# Patient Record
Sex: FEMALE | Race: WHITE
[De-identification: names, ages, dates, MRNs, and addresses within clinical notes are randomized per-mention and may not be internally consistent; named-entity substitution may affect disease eponyms.]

---

## 2017-01-19 ENCOUNTER — HOSPITAL ENCOUNTER (EMERGENCY)
Dept: HOSPITAL 9 - MADERS | Age: 72
Discharge: HOME | End: 2017-01-19
Payer: MEDICARE

## 2017-01-19 DIAGNOSIS — Z79.899: ICD-10-CM

## 2017-01-19 DIAGNOSIS — I10: ICD-10-CM

## 2017-01-19 DIAGNOSIS — N39.0: Primary | ICD-10-CM

## 2017-01-19 DIAGNOSIS — J01.90: ICD-10-CM

## 2017-01-19 LAB
BACTERIA UR QL AUTO: (no result) HPF
PROT UR STRIP.AUTO-MCNC: 100 MG/DL
RBC UR QL AUTO: (no result) HPF (ref 0–3)
SP GR UR STRIP: 1.03 (ref 1–1.04)
UNIDENT CRYS #/AREA URNS HPF: (no result) HPF
WBC UR QL AUTO: (no result) HPF (ref 0–3)

## 2017-01-19 PROCEDURE — 81003 URINALYSIS AUTO W/O SCOPE: CPT

## 2017-01-19 PROCEDURE — 81015 MICROSCOPIC EXAM OF URINE: CPT

## 2017-01-19 PROCEDURE — 87086 URINE CULTURE/COLONY COUNT: CPT

## 2017-01-19 PROCEDURE — 99283 EMERGENCY DEPT VISIT LOW MDM: CPT

## 2017-01-19 NOTE — PICIS
[facility]

                                EMERGENCY RECORD



TRIAGE (19:04 MCRS)

TRIAGE NOTES:  BURNING WITH URINATION STARTED YESTERDAY -

      TODAY HAS NAUSIA AND VOMITING - ALSO COMPLAINT OF HEADACHE X1 MONTH

      WITH VERTIGO PRESENT TREATED WITH ANTIBIOTICS. (19:04 MCRS)

PATIENT: AGE: 71, GENDER: female, : , TIME OF

      GREET:  18:54, PREFERRED LANGUAGE: English,

      ETHNICITY: Not  or , ECODE BILLING MAP: [facility], Zip Code: [address], KG WEIGHT: 66.68, MEDICAL RECORD

      NUMBER: [id number], ACCOUNT NUMBER: [id number], PERSON ID:

      [id number], PCP: HEAD. (19:04 MCRS)

  NAME: Yovany Martini, PHONE: [phone] CELL. (20:32)

COMPLAINT:  HEADACHES, VOMITING, BLADDER INFECTION. (19:04 MCRS)

ADMISSION: URGENCY: 4 Non Urgent, ADMISSION SOURCE: Home,

      TRANSPORT: CAR, BED: ED -02. (19:04 MCRS)

ASSESSMENT: Assessment: NAUSIA VOMITING WITH BURINING WITH

      URINATION - HEADACHE AND DIZZINESS. (19:29 MCRS)

PAIN: Patient complains of pain described as,

      aching, on a scale 0-10 patient rates pain as 10,

      Location HEHIND EYES TO BACK OF HEAD, Pain is

      constant, Aggravating factors:, No relieving factors. (19:29

      MCRS)

SIRS SCORING: Heart Rate  (0), Temp range 96.8-101.1 (0),

      respiratory rate 12-24 (0), Mental Status altered: no (0). (19:29

      MCRS)

PROVIDERS: TRIAGE NURSE: Antonio Berumen RN. (19:04 MCRS)

VITAL SIGNS: /87, Pulse 102, Resp 18, Temp 98.9,

      (Tympanic), Pain 0, O2 Sat 95, on Room Air, Time 2017 19:00.

      (19:00 MCRS)



KNOWN ALLERGIES

Ingraham

Penicillins

Shellfish Containing Products



CURRENT MEDICATIONS

meTOPROLOL tartrate: 

      TABLET : Strength - 25 mg : ORAL

      Patient Dose: 25 mg Oral 2 times a day. (19:11 MCRS)

ursodiol: 

      CAPSULE : Strength - 300 mg : ORAL

      Patient Dose: 1 tab(s) Oral 3 times a day. (19:12 MCRS)

cyanocobalamin (vitamin B-12): 

      SYRINGE (ML) : Strength - 1,000 mcg/mL : INJECTION

      Patient Dose: 1 mcg Intramuscular once a month. (19:13

      MCRS)

potassium chloride: 

      TABLET, EXTENDED RELEASE : Strength - 20 mEq : ORAL

      Patient Dose: 20 mEq Oral once a day. (19:15 MCRS)



VITAL SIGNS (19:00 MCRS)



&a-1R&a+25V*p+0X*o7982I*c202B*c15G*c2P*p-0X&a-25V&a+1R           Name: Yovany Martini  : 1945 F71 MedRec: [id number]

                             AcctNum: [id number]

  Prepared: hal 2017 23:51 by Interface                 Page 1 of 9

                                      pMD

                        [facility]

                                EMERGENCY RECORD



VITAL SIGNS: BP: 159/87, Pulse: 102, Resp: 18, Temp: 98.9

      (Tympanic), Pain: 0, O2 sat: 95 on Room Air, Time: 2017 19:00.



NURSING ASSESSMENT: FALL RISK (20:29 MCRS)

FALL RISK: Sensory deficits (1), Total score

      1.

HENDRICH II FALL RISK: dizziness or vertigo(1), Total

      score 1.



NURSING ASSESSMENT: HEAD-TO-TOE (19:30 MCRS)

CONSTITUTIONAL: Patient arrives ambulatory, Gait steady, History

      obtained from patient, Patient appears, uncomfortable,

      Patient cooperative, Patient alert, Oriented to person, place and

      time, Skin warm, Skin dry, Skin normal in color, Mucous membranes

      pink, Mucous membranes moist, Patient is well-groomed, Patient

      complains of HEAD ACHE - NAUSIA VOMITING -BURNING WITH URINATION.

PAIN: pressure pain, BEHIND EYES, Onset of pain X1

      DAY, on a scale 0-10 patient rates pain as 10,

      BURNING URINE ONSET YESTERDAY - DIZZINESS FOR ON AND OFF SINCE

      CHRISTMASS.

NONVERBAL PAIN: Non Verbal pain assessment findings include:

      Non-verbal expressions of pain at rest (1), Facial Grimaces

      present at rest (1).

ENT: Dizziness, ON AND OFF DIZZINESS - STATES

      SINUS ISSUES, Nasal mucosa, swollen.

GENITOURINARY FEMALE: Associated with urinary

      complaints, burning, Date and time of last void:

      1 HOUR AGO.

SAFETY: Side rails up, Cart/Stretcher in lowest position, Family

      at bedside, Call light within reach, Hospital ID band on.



NURSING PROCEDURE: DISCHARGE NOTE (21:40 MCRS)

DISCHARGE: Patient discharged to home, in a wheelchair,

      family driving, accompanied by /wife/partner, Summary of Care

      printed/ provided, Patient requested and was provided an electronic

      copy of Discharge Instructions, Transition record given to patient,

      Discharge instructions given to , Simple or moderate discharge

      teaching performed, DISCHARGE INSTRUCTIONS, Prescriptions

      given and instructions on side effects given, Name of prescription(s)

      given: SEE LIST, Medication reconciliation form given, and reviewed

      with , Above person(s) verbalized understanding of discharge

      instructions and follow-up care, Patient treated and evaluated by

      physician.

BELONGINGS: Valuables remain with patient.



NURSING PROCEDURE: NURSE NOTES (20:30 MCRS)

NURSES NOTES: Patient assisted to bathroom with steady gait,

      Patient examined by physician.



NURSING PROCEDURE: URINE COLLECTION (20:30 MCRS)



&a-1R&a+25V*p+0X*f1710G*c202B*c15G*c2P*p-0X&a-25V&a+1R           Name: Yovany Martini  : 1945 F71 MedRec: [id number]

                             AcctNum: [id number]

  Prepared: Thu 2017 23:51 by Interface                 Page 2 of 9

                                      [facility]

                                EMERGENCY RECORD



PATIENT IDENTIFIER: Patient actively involved in identification

      process, Patient's identity verified by hospital ID bracelquinten.

URINE COLLECTION FEMALE: Urine collection indicated for BURNING

      URINATION, Urine collected by mid-stream clean catch, Output amount

      (mL) 15, urine tea in color, and purulent,

      sediment noted, Specimen labeled in the presence of the

      patient and sent to lab, Specimen obtained for culture labeled in the

      presence of the patient and sent to lab, TURBID APPEARANCE.

SAFETY: Side rails up, Cart/Stretcher in lowest position, Family

      at bedside, Call light within reach, Hospital ID band on.



ORDER DETAILS



      Order Name: Culture, Urine, Status: Canceled, Time: 20:44 2017,

      User: [email or web address],

       - Ordered for: MD Frannie, Jhony,

       - Entered by: DWAYNE Ward, Yariel - Thu 2017 20:43,

       - Quantity: 1,

      Order Name: Culture, Urine, Status: Active, Time: 20:42 2017,

      User: [email or web address],

       - Ordered for: MD Kathleen Lloyd,

       - Entered by: MD Kathleen Lloyd - Thu 2017 20:42,

       - Quantity: 1,

      Order Name: Urinalysis w/ Rflx Microscopic, Status: Active, Time:

      20:41 2017, User: [email or web address],

       - Ordered for: MD Kathleen Lloyd,

       - Entered by: DWAYNE Ward, Yariel - Thu 2017 20:41,

       - Quantity: 1.



MEDICATION ADMINISTRATION SUMMARY



      Drug Name: *acetaminophen-codeine, Dose Ordered: 2 tab(s), Route:

      Oral, Status: Given, Time: 21:26 2017,

      Drug Name: Zofran ODT, Dose Ordered: 8 mg, Route: Sublingual, Status:

      Given, Time: 21:18 2017,

      Drug Name: Zithromax oral, Dose Ordered: 500 mg, Route: Oral, Status:

      Given, Time: 21:17 2017,

      Drug Name: *Afrin (oxymetazoline), Dose Ordered: 2 puff(s), Route:

      Nares Both, Status: Given, Time: 21:17 2017, *Additional

      information available in notes, Detailed record available in

      Medication Service section.



MEDICATION SERVICE

acetaminophen-codeine:  Order: acetaminophen-codeine

      (acetaminophen/codeine phosphate) - Dose: 2 tab(s) : Oral

      Schedule: Now

      Notes: each tab 

      Ordered by: Jhony Kathleen MD

      Entered by: Jhony Kathleen MD Thu 2017 21:19 ,

      Acknowledged by: Antonio Berumen, RN Thu 2017 21:22



&a-1R&a+25V*p+0X*t2891J*c202B*c15G*c2P*p-0X&a-25V&a+1R           Name: Yovany Martini  : 1945 F71 MedRec: [id number]

                             AcctNum: [id number]

  Prepared:  23:51 by Interface                 Page 3 of 9

                                      pMD

                        [facility]

                                EMERGENCY RECORD



      Documented as given by: Antonio Berumen RN Thu 2017 21:26

      Patient, Medication, Dose, Route and Time verified prior to

      administration.

       Amount given: 2 TABS, Site: Medication administered P.O., Patient

      appears Awake and alert- acceptable, Correct patient, time, route,

      dose and medication confirmed prior to administration, Patient

      advised of actions and side-effects prior to administration,

      Allergies confirmed and medications reviewed prior to administration,

      Patient in position of comfort, Side rails up, Cart in lowest

      position, Family at bedside.

Afrin (oxymetazoline):  Order: Afrin (oxymetazoline)

      (oxymetazoline HCl) - Dose: 2 puff(s) : Nares Both

      Schedule: Now

      Notes: use morning and bedtime for 4-5 days and then just at bedtime

      until infection controlled

      Ordered by: Jhony Kathleen MD

      Entered by: Jhony Kathleen MD Thu 2017 20:58 ,

      Acknowledged by: Antonio Berumen RN Thu 2017 21:09

      Documented as given by: Antonio Berumen RN Thu 2017 21:17

      Patient, Medication, Dose, Route and Time verified prior to

      administration.

       Amount given: 2 PUFFS, Site: Medication administered bilateral

      nares, Instructed to blow nose prior to administration, Correct

      patient, time, route, dose and medication confirmed prior to

      administration, Patient advised of actions and side-effects prior to

      administration, Allergies confirmed and medications reviewed prior to

      administration, Advised not to ambulate without assistance, Patient

      in position of comfort, Side rails up, Cart in lowest position,

      Family at bedside.

Zithromax oral:  Order: Zithromax oral (azithromycin) -

      Dose: 500 mg : Oral

      Schedule: Now

      Ordered by: Jhony Kathleen MD

      Entered by: Jhony Kathleen MD Thu 2017 20:56 ,

      Acknowledged by: Antonio Berumen RN Thu 2017 21:09

      Documented as given by: Antonio Berumen RN Thu 2017 21:17

      Patient, Medication, Dose, Route and Time verified prior to

      administration.

       Amount given: 500MG, Site: Medication administered P.O., Patient

      appears Awake and alert- acceptable, Correct patient, time, route,

      dose and medication confirmed prior to administration, Patient

      advised of actions and side-effects prior to administration,

      Allergies confirmed and medications reviewed prior to administration,

      Patient in position of comfort, Side rails up, Cart in lowest

      position, Family at bedside.

Zofran ODT:  Order: Zofran ODT (ondansetron) - Dose: 8

      mg : Sublingual

      Schedule: Now

      Ordered by: Jhony Kathleen MD

      Entered by: Jhony Kathleen MD Thu 2017 20:56 ,



&a-1R&a+25V*p+0X*k4627J*c202B*c15G*c2P*p-0X&a-25V&a+1R           Name: Yovany Martini  : 1945 F71 MedRec: [id number]

                             AcctNum: [id number]

  Prepared: Thu 2017 23:51 by Interface                 Page 4 of 9

                                      pMD

                        [facility]

                                EMERGENCY RECORD



      Acknowledged by: Antonio Berumen RN Thu 2017 21:10

      Documented as given by: Antonio Berumen RN Thu 2017 21:18

      Patient, Medication, Dose, Route and Time verified prior to

      administration.

       Amount given: 8MG, Site: Medication administered S.L., Patient

      appears Awake and alert- acceptable, Correct patient, time, route,

      dose and medication confirmed prior to administration, Patient

      advised of actions and side-effects prior to administration,

      Allergies confirmed and medications reviewed prior to administration,

      Patient in position of comfort, Side rails up, Cart in lowest

      position, Family at bedside.



HPI GENERAL (21:10 LLDO)

CHIEF COMPLAINT: Patient presents for evaluation of marked

      dysuria and back pain started 2 days age. pt began taking macrobid

      from previous infection and pain better today. yesterday started

      sinus pressure/pain with stuffy nose and fever. hx sinus

      problems.

HISTORIAN: History provided by patient, History provided by

      patient's spouse, History provided by patient's family, DAUGHTER.

MECHANISM OF INJURY: Mechanism of injury: APPEARS

      INFECTIOUS.

LOCATION: Symptoms are generalized.

QUALITY: Pain is dull in nature, described as

      aching, described as pressure-like, described as

      throbbing.

SEVERITY: Maximum severity of symptoms moderate,

      Currently symptoms are moderate.

TIME COURSE: Sudden onset of symptoms, Symptoms

      are worsening, are constant.

ASSOCIATED WITH: Associated with ONLY AS ABOVE.

EXACERBATED BY: Patient's condition exacerbated by

      ACTIVITY.

RELIEVED BY: Patient's condition relieved by nothing.



ROS

CONSTITUTIONAL: Historian reports fatigue, reports

      fever, reports malaise. (21:13 LLDO)

EYES: Negative eye review of systems, Historian denies eye pain,

      denies eye redness, denies eye discharge. (21:18 LLDO)

ENT: Historian reports sinus pain, reports sore

      throat. (21:13 LLDO)

CARDIOVASCULAR: Historian reports chest pain,

      radiation to, Historian reports dyspnea on

      exertion. (21:13 LLDO)

RESPIRATORY: Historian reports cough, reports

      sputum. described as thick. (21:13 LLDO)

GI: Historian reports nausea, reports

      vomiting. (21:13 LLDO)

GENITOURINARY FEMALE: Historian reports dysuria,



&a-1R&a+25V*p+0X*x0713E*c202B*c15G*c2P*p-0X&a-25V&a+1R           Name: Yovany Martini  : 1945 F71 MedRec: [id number]

                             AcctNum: [id number]

  Prepared: Thu 2017 23:51 by Interface                 Page 5 of 9

                                      pMD

                        [facility]

                                EMERGENCY RECORD



      reports frequency. (21:13 LLDO)

MUSCULOSKELETAL: Historian reports myalgias. (21:13

      LLDO)

SKIN: Negative skin review of systems, Historian denies

      cellulitis, denies rash, denies skin changes, denies skin lesions.

      (21:18 LLDO)

NEUROLOGIC: Historian denies confusion, denies dizziness, denies

      dysphasia, denies focal weakness, denies gait changes, reports

      headache, denies irritability, denies lethargy, denies mental

      status changes. (21:13 LLDO)

HEMO/LYMPHATIC: Normal hematologic/lymphatic system review,

      Historian denies abnormal blood clotting, denies gum bleeding, denies

      petechiae. (21:18 LLDO)

ALLERGIC/IMMUNOLOGIC: Normal allergy/immunologic system review,

      Historian denies eczema, denies environmental allergies, denies food

      allergies. (21:18 LLDO)

PSYCHIATRIC: Negative psychiatric review of systems, Historian

      denies alcohol abuse, denies anxiety, denies depression, denies drug

      abuse, denies hallucinations. (21:18 LLDO)

NOTES: All systems reviewed, negative except as described above.

      (21:13 LLDO)



PAST MEDICAL HISTORY

MEDICAL HISTORY: Notes: VERTIGO DUE TO SINUS ISSUES -

      CIRHOSIS OF LIVER-, Flu vaccine up to date, Date of

      immunization: 10/1/2016, Tetanus immunization up to date, Date of

      immunization: , Pneumococcal vaccine up to date, Date of

      immunization: , Past medical history includes endocrine

      disease, history of hypertension. which has been

      treated. (19:29 MCRS)

FEMALE SURGICAL HISTORY:  APPENDECTOMY ,

      Surgical history of cholecystectomy, laparoscopic, Date of

      surgery , Surgical history of hysterectomy, Date of

      surgery , Surgical history of oophorectomy, of the

      right ovary, Date of surgery , Surgical history of

      orthopedic surgery, RIGHT HIP, Date of surgery .

      (19:29 MCRS)

PSYCHIATRIC HISTORY: Notes: DENIES. (19:29 MCRS)

SOCIAL HISTORY: Patient denies alcohol use, Patient denies drug

      use, Patient has no smoking history, Lives at home, with family.

      (19:29 MCRS)

NOTES: Nursing records reviewed, Agree with nursing records,

      Medication list reviewed. (21:17 LLDO)



PHYSICAL EXAM

CONSTITUTIONAL: Vital signs reviewed, Patient afebrile, Pulse

      normal, Blood pressure, BP ELEVATED, Respiratory

      rate normal, Patient appears, uncomfortable,

      Patient appears in pain, in moderate pain

      distress, Patient alert and oriented to person, place and time.



&a-1R&a+25V*p+0X*d1580F*c202B*c15G*c2P*p-0X&a-25V&a+1R           Name: Yovany Martini  : 1945 F71 MedRec: [id number]

                             AcctNum: [id number]

  Prepared: Thu 2017 23:51 by Interface                 Page 6 of 9

                                      pMD

                        [facility]

                                EMERGENCY RECORD



      (21:15 LLDO)

HEAD: Head exam normal, Head exam included findings of head

      atraumatic, normocephalic. (21:18 LLDO)

EYES: Eye exam normal, Eye exam included findings of eyelids

      normal to inspection, Pupils equally round and reactive to light,

      Extraocular muscles intact. (21:18 LLDO)

ENT: Ear exam normal, Nose exam included findings of,

      stuffy, Pharynx, injected bilaterally,

      with swelling bilaterally, symmetrical, Uvula exam normal,

      Tonsil exam normal, Sinus exam included findings of frontal

      sinuses with, tenderness bilaterally, Maxillary

      sinuses with, tenderness bilaterally, erythema

      bilaterally. (21:15 LLDO)

NECK: Neck exam included findings of normal range of motion,

      Trachea midline, Thyroid normal, no meningeal signs, Cervical

      adenopathy, diffuse, multiple nodes,

      tender, swollen. (21:15 LLDO)

RESPIRATORY CHEST: Respiratory exam included findings of no

      respiratory distress, Rales present, Chest exam included

      findings of chest movement symmetrical, Chest expansion equal, RALES

      MILD AND DIFFUSE. (21:15 LLDO)

CARDIOVASCULAR: Cardiovascular assessment normal, Cardiovascular

      exam included findings of heart rate regular rate and rhythm, Heart

      sounds normal. (21:18 LLDO)

ABDOMEN FEMALE: Abdominal exam normal, Abdominal exam included

      findings of abdomen nontender, Bowel sounds normal, no peritoneal

      signs. (21:18 LLDO)

BACK: Back exam normal, Back exam included findings of normal

      inspection, range of motion normal. (21:18 LLDO)

UPPER EXTREMITY: Upper extremity exam normal, Upper extremity

      exam included findings of inspection normal, Range of motion normal.

      (21:18 LLDO)

LOWER EXTREMITY: Lower extremity exam normal, Lower extremity

      exam included findings of inspection normal, Range of motion normal.

      (21:18 LLDO)

NEURO: Roxy coma scale 15, Neuro exam findings include patient

      oriented to person, place and time, Speech normal, Gait normal,

      Memory normal, Cranial nerves intact, Deep tendon reflexes normal, no

      focal motor deficits, no focal sensory deficits, no cerebellar

      deficits, no nystagmus. (21:15 LLDO)

SKIN: Skin exam normal, Skin exam included findings of skin warm,

      dry, and normal in color, no rash. (21:18 LLDO)

PSYCHIATRIC: Psychiatric exam normal, Psychiatric exam included

      findings of patient oriented to person place and time, Normal affect.

      (21:18 LLDO)



EVENTS

TRANSFER:  Triage to Emergency Main ED -02. (Thu 2017

      19:04 MCRS)

   Removed from Emergency Main ED -02. (21:46 MCRS)



&a-1R&a+25V*p+0X*p6829N*c202B*c15G*c2P*p-0X&a-25V&a+1R           Name: Yovany Martini  : 1945 F71 MedRec: [id number]

                             AcctNum: [id number]

  Prepared: Thu 2017 23:51 by Interface                 Page 7 of 9

                                      pMD

                        [facility]

                                EMERGENCY RECORD





PROBLEM LIST

  No recorded problems



DIAGNOSIS (21:01 LLDO)

FINAL: PRIMARY: ACUTE SINUSITIS UNSPECIFIED,

      ADDITIONAL: UTI SITE NOT SPECIFIED.



DISPOSITION

PATIENT:  Disposition Type: Discharge, Disposition: *Discharge

      Home. (21:01 LLDO)

   [facility]position Transport[facility], [facility]

   Patient left the department. (21:46 [facility])



INSTRUCTION (21:08 LLDO)

DISCHARGE:  SINUSITIS, ABX TX, UTI CYSTITIS FEMALE ADULT.

FOLLOWUP: Follow up with Primary Care Physician in 7-10 days.

SPECIAL:  Follow-up with your PCP.



PRESCRIPTION

Tylenol-Codeine #3:  TABLET : 300 mg-30 mg : ORAL : Quantity: ***

      1 *** Unit: tab(s) Route: ORAL Schedule: every 4 hours prn Dispense:

      *** 20 ***

      May substitute. Refills: *** No Refills ***

      POTENTIAL ALLERGY REACTION: 'Ingraham

      [acetaminophen/hydrocodone/hydrocodone bitartrate]'

      Override Rationale: Reviewed with patient, pt says can safely take

      this medicine. (21:05 LLDO)

NOTES:  No Refills. (21:05 LLDO)

Zithromax Z-Rony:  CAPSULE (HARD, SOFT, ETC.) : 250 mg : ORAL :

      Quantity: *** * *** Unit: Route: ORAL Schedule: See Notes Dispense:

      *** 1PK ***

      May substitute. Refills: *** No Refills ***. (21:05 LLDO)

NOTES:  TAKE AS DIRECTED ON PACKAGE

       No Refills. (21:05 LLDO)

Septra DS:  TABLET : 800 mg-160 mg : ORAL : Quantity: *** 1 ***

      Unit: tab(s) Route: ORAL Schedule: 2 times a day (before meals)

      Dispense: *** 20 *** Unit: tab(s)

      May substitute. Refills: *** No Refills ***. (21:07 LLDO)

Zofran ODT:  TABLET, RAPID DISSOLVE : 4 mg : ORAL : Quantity: ***

      1-2 *** Unit: tab(s) Route: ORAL Schedule: every 6 hours PRN

      Dispense: *** 12 ***

      May substitute. Refills: *** 1 ***. (21:07 LLDO)

NOTES:  DISSOLVE UNDER TONGUE. (21:07 LLDO)



IMAGING (21:44 MCRS)

*SUPPLY CHARGE SHEET:  Image captured from scanner.

*DISCHARGE INSTRUCTIONS RECEIPT:  Image captured from scanner.

   Page 2 added. Image captured from scanner.





&a-1R&a+25V*p+0X*n9079R*c202B*c15G*c2P*p-0X&a-25V&a+1R           Name: Yovany Martini  : 1945 F71 MedRec: [id number]

                             AcctNum: [id number]

  Prepared:  23:51 by Interface                 Page 8 of 9

                                      pMD

                        [facility]

                                EMERGENCY RECORD



ADMIN

DIGITAL SIGNATURE:  DWAYNE Ward Eugene. (20:42 EROG)

   DWAYNE Ward Eugene. (20:46 EROG)

   DWAYNE Ward Eugene. (20:47 EROG)

   DWAYNE Berumen Mikel. (21:46 MCRS)

   MD Kathleen Lloyd. (23:40 LLDO)

[place]:

  EROG=DWAYNE Ward Eugene LLDO=MD Kathleen Lloyd MCRS=DWAYNE Berumen Mikel























































































&a-1R&a+25V*p+0X*m9176W*c202B*c15G*c2P*p-0X&a-25V&a+1R           Name: Yovany Martini  : 1945 F71 MedRec: [id number]

                             AcctNum: [id number]

  Prepared:  23:51 by Interface                 Page 9 of 9

                                      pMD





                     [facility]

                        MEDICATION RECONCILIATION



    You were seen in the Emergency Department on: 

    

    KNOWN ALLERGIES

         Norco

         Penicillins

         Shellfish Containing Products

    

    MEDICATIONS GIVEN WHILE IN THE EMERGENCY DEPARTMENT

    Zithromax oral (azithromycin) - Dose: 500 milligram(s) : Oral

    Zofran ODT (ondansetron) - Dose: 8 milligram(s) : Sublingual

    Afrin (oxymetazoline) (oxymetazoline HCl) - Dose: 2 puff(s) : Nares

    Both

    acetaminophen-codeine (acetaminophen/codeine phosphate) - Dose: 2

    tab(s) : Oral

    

    HOME MEDICATIONS 

    

      CONTINUE AS PRESCRIBED

      cyanocobalamin (vitamin B-12) : SYRINGE (ML) : Strength - 1,000

      mcg/mL : INJECTION

        Continue as prescribed

        Patient had been takin mcg Intramuscular once a month.

    

      meTOPROLOL tartrate : TABLET : Strength - 25 mg : ORAL

        Continue as prescribed

        Patient had been takin mg Oral 2 times a day.

    

      potassium chloride : TABLET, EXTENDED RELEASE : Strength - 20 mEq

      : ORAL

        Continue as prescribed

        Patient had been takin mEq Oral once a day.

    

      ursodiol : CAPSULE : Strength - 300 mg : ORAL

        Continue as prescribed

        Patient had been takin tab(s) Oral 3 times a day.

    

    Notes from the emergency department

      Reviewed with family

      Reviewed with patient

      Reviewed with family

      Reviewed with patient

    

    PRESCRIPTIONS (4)

    







&a-1R&a+25V*p+0X*k7614P*c202B*c15G*c2P*p-0X&a-25V&a+1R        Name: Vini 
Yousufleroy  : 1945 F71 MedRec: [id number]

                          AcctNum: [id number]

               Prepared: hal 2017 23:51 by Interface

                                   pMD





                     [facility]

                        MEDICATION RECONCILIATION



    Printed (4)

    

      Tylenol-Codeine #3 : TABLET : 300 mg-30 mg : ORAL

          Quantity: 1, Unit: tab(s), Route: ORAL, Schedule: every 4

          hours prn, Dispense: 20

    

      Zithromax Z-Rony : CAPSULE (HARD, SOFT, ETC.) : 250 mg : ORAL

          Quantity: *, Unit: *, Route: ORAL, Schedule: See Notes,

          Dispense: 1PK

    

      Septra DS : TABLET : 800 mg-160 mg : ORAL

          Quantity: 1, Unit: tab(s), Route: ORAL, Schedule: 2 times a

          day (before meals), Dispense: 20 Unit: tab(s)

    



































































&a-1R&a+25V*p+0X*q4230K*c202B*c15G*c2P*p-0X&a-25V&a+1R        Name: Yovany Martini: 1945 F71 MedRec: [id number]

                          AcctNum: [id number]

               Prepared: Thu 2017 23:51 by Interface

                                   pMVONDA PRADO

## 2017-01-19 NOTE — ERRECORD
[facility]

                                EMERGENCY RECORD



HPI GENERAL (21:10 LLDO)

CHIEF COMPLAINT: Patient presents for evaluation of marked

      dysuria and back pain started 2 days age. pt began taking macrobid

      from previous infection and pain better today. yesterday started

      sinus pressure/pain with stuffy nose and fever. hx sinus

      problems.

HISTORIAN: History provided by patient, History provided by

      patient's spouse, History provided by patient's family, DAUGHTER.

MECHANISM OF INJURY: Mechanism of injury: APPEARS

      INFECTIOUS.

LOCATION: Symptoms are generalized.

QUALITY: Pain is dull in nature, described as

      aching, described as pressure-like, described as

      throbbing.

SEVERITY: Maximum severity of symptoms moderate,

      Currently symptoms are moderate.

TIME COURSE: Sudden onset of symptoms, Symptoms

      are worsening, are constant.

ASSOCIATED WITH: Associated with ONLY AS ABOVE.

EXACERBATED BY: Patient's condition exacerbated by

      ACTIVITY.

RELIEVED BY: Patient's condition relieved by nothing.



ROS

CONSTITUTIONAL: Historian reports fatigue, reports

      fever, reports malaise. (21:13 LLDO)

EYES: Negative eye review of systems, Historian denies eye pain,

      denies eye redness, denies eye discharge. (21:18 LLDO)

ENT: Historian reports sinus pain, reports sore

      throat. (21:13 LLDO)

CARDIOVASCULAR: Historian reports chest pain,

      radiation to, Historian reports dyspnea on

      exertion. (21:13 LLDO)

RESPIRATORY: Historian reports cough, reports

      sputum. described as thick. (21:13 LLDO)

GI: Historian reports nausea, reports

      vomiting. (21:13 LLDO)

GENITOURINARY FEMALE: Historian reports dysuria,

      reports frequency. (21:13 LLDO)

MUSCULOSKELETAL: Historian reports myalgias. (21:13

      LLDO)

SKIN: Negative skin review of systems, Historian denies

      cellulitis, denies rash, denies skin changes, denies skin lesions.

      (21:18 LLDO)

NEUROLOGIC: Historian denies confusion, denies dizziness, denies

      dysphasia, denies focal weakness, denies gait changes, reports

      headache, denies irritability, denies lethargy, denies mental

      status changes. (21:13 LLDO)

HEMO/LYMPHATIC: Normal hematologic/lymphatic system review,

      Historian denies abnormal blood clotting, denies gum bleeding, denies



&a-1R&a+25V*p+0X*r4594X*c202B*c15G*c2P*p-0X&a-25V&a+1R           Name: Yovany Martini  : 1945 F71 MedRec: [id number]

                             AcctNum: [id number]

  Prepared: Thu 2017 23:45 by Interface                 Page 1 of 5

                                      pMD

                        [facility]

                                EMERGENCY RECORD



      petechiae. (21:18 LLDO)

ALLERGIC/IMMUNOLOGIC: Normal allergy/immunologic system review,

      Historian denies eczema, denies environmental allergies, denies food

      allergies. (21:18 LLDO)

PSYCHIATRIC: Negative psychiatric review of systems, Historian

      denies alcohol abuse, denies anxiety, denies depression, denies drug

      abuse, denies hallucinations. (21:18 LLDO)

NOTES: All systems reviewed, negative except as described above.

      (21:13 LLDO)



PAST MEDICAL HISTORY

MEDICAL HISTORY: Notes: VERTIGO DUE TO SINUS ISSUES -

      CIRHOSIS OF LIVER-, Flu vaccine up to date, Date of

      immunization: 10/1/2016, Tetanus immunization up to date, Date of

      immunization: , Pneumococcal vaccine up to date, Date of

      immunization: , Past medical history includes endocrine

      disease, history of hypertension. which has been

      treated. (19:29 MCRS)

FEMALE SURGICAL HISTORY:  APPENDECTOMY ,

      Surgical history of cholecystectomy, laparoscopic, Date of

      surgery , Surgical history of hysterectomy, Date of

      surgery , Surgical history of oophorectomy, of the

      right ovary, Date of surgery , Surgical history of

      orthopedic surgery, RIGHT HIP, Date of surgery .

      (19:29 MCRS)

PSYCHIATRIC HISTORY: Notes: DENIES. (19:29 MCRS)

SOCIAL HISTORY: Patient denies alcohol use, Patient denies drug

      use, Patient has no smoking history, Lives at home, with family.

      (19:29 MCRS)

NOTES: Nursing records reviewed, Agree with nursing records,

      Medication list reviewed. (21:17 LLDO)



KNOWN ALLERGIES

[place]

Penicillins

Shellfish Containing Products



CURRENT MEDICATIONS

meTOPROLOL tartrate: 

      TABLET : Strength - 25 mg : ORAL

      Patient Dose: 25 mg Oral 2 times a day. (19:11 MCRS)

ursodiol: 

      CAPSULE : Strength - 300 mg : ORAL

      Patient Dose: 1 tab(s) Oral 3 times a day. (19:12 MCRS)

cyanocobalamin (vitamin B-12): 

      SYRINGE (ML) : Strength - 1,000 mcg/mL : INJECTION

      Patient Dose: 1 mcg Intramuscular once a month. (19:13

      MCRS)

potassium chloride: 

      TABLET, EXTENDED RELEASE : Strength - 20 mEq : ORAL



&a-1R&a+25V*p+0X*r1250Z*c202B*c15G*c2P*p-0X&a-25V&a+1R           Name: Yovany Martini  : 1945 F71 MedRec: [id number]

                             AcctNum: [id number]

  Prepared: Th 2017 23:45 by Interface                 Page 2 of 5

                                      pMD

                        [facility]

                                EMERGENCY RECORD



      Patient Dose: 20 mEq Oral once a day. (19:15 MCRS)



VITAL SIGNS (19:00 MCRS)

VITAL SIGNS: BP: 159/87, Pulse: 102, Resp: 18, Temp: 98.9

      (Tympanic), Pain: 0, O2 sat: 95 on Room Air, Time: 2017 19:00.



PHYSICAL EXAM

CONSTITUTIONAL: Vital signs reviewed, Patient afebrile, Pulse

      normal, Blood pressure, BP ELEVATED, Respiratory

      rate normal, Patient appears, uncomfortable,

      Patient appears in pain, in moderate pain

      distress, Patient alert and oriented to person, place and time.

      (21:15 LLDO)

HEAD: Head exam normal, Head exam included findings of head

      atraumatic, normocephalic. (21:18 LLDO)

EYES: Eye exam normal, Eye exam included findings of eyelids

      normal to inspection, Pupils equally round and reactive to light,

      Extraocular muscles intact. (21:18 LLDO)

ENT: Ear exam normal, Nose exam included findings of,

      stuffy, Pharynx, injected bilaterally,

      with swelling bilaterally, symmetrical, Uvula exam normal,

      Tonsil exam normal, Sinus exam included findings of frontal

      sinuses with, tenderness bilaterally, Maxillary

      sinuses with, tenderness bilaterally, erythema

      bilaterally. (21:15 LLDO)

NECK: Neck exam included findings of normal range of motion,

      Trachea midline, Thyroid normal, no meningeal signs, Cervical

      adenopathy, diffuse, multiple nodes,

      tender, swollen. (21:15 LLDO)

RESPIRATORY CHEST: Respiratory exam included findings of no

      respiratory distress, Rales present, Chest exam included

      findings of chest movement symmetrical, Chest expansion equal, RALES

      MILD AND DIFFUSE. (21:15 LLDO)

CARDIOVASCULAR: Cardiovascular assessment normal, Cardiovascular

      exam included findings of heart rate regular rate and rhythm, Heart

      sounds normal. (21:18 LLDO)

ABDOMEN FEMALE: Abdominal exam normal, Abdominal exam included

      findings of abdomen nontender, Bowel sounds normal, no peritoneal

      signs. (21:18 LLDO)

BACK: Back exam normal, Back exam included findings of normal

      inspection, range of motion normal. (21:18 LLDO)

UPPER EXTREMITY: Upper extremity exam normal, Upper extremity

      exam included findings of inspection normal, Range of motion normal.

      (21:18 LLDO)

LOWER EXTREMITY: Lower extremity exam normal, Lower extremity

      exam included findings of inspection normal, Range of motion normal.

      (21:18 LLDO)

NEURO: [place] coma scale 15, Neuro exam findings include patient

      oriented to person, place and time, Speech normal, Gait normal,

      Memory normal, Cranial nerves intact, Deep tendon reflexes normal, no



&a-1R&a+25V*p+0X*k0629I*c202B*c15G*c2P*p-0X&a-25V&a+1R           Name: Yovany Martini  : 1945 F71 MedRec: [id number]

                             AcctNum: [id number]

  Prepared: Thu 2017 23:45 by Interface                 Page 3 of 5

                                      pMD

                        [facility]

                                EMERGENCY RECORD



      focal motor deficits, no focal sensory deficits, no cerebellar

      deficits, no nystagmus. (21:15 LLDO)

SKIN: Skin exam normal, Skin exam included findings of skin warm,

      dry, and normal in color, no rash. (21:18 LLDO)

PSYCHIATRIC: Psychiatric exam normal, Psychiatric exam included

      findings of patient oriented to person place and time, Normal affect.

      (21:18 LLDO)



MEDICATION ADMINISTRATION SUMMARY



      Drug Name: *acetaminophen-codeine, Dose Ordered: 2 tab(s), Route:

      Oral, Status: Given, Time: 21:26 2017,

      Drug Name: Zofran ODT, Dose Ordered: 8 mg, Route: Sublingual, Status:

      Given, Time: 21:18 2017,

      Drug Name: Zithromax oral, Dose Ordered: 500 mg, Route: Oral, Status:

      Given, Time: 21:17 2017,

      Drug Name: *Afrin (oxymetazoline), Dose Ordered: 2 puff(s), Route:

      Nares Both, Status: Given, Time: 21:2017, *Additional

      information available in notes, Detailed record available in

      Medication Service section.



PROBLEM LIST

  No recorded problems



DIAGNOSIS (21:01 LLDO)

FINAL: PRIMARY: ACUTE SINUSITIS UNSPECIFIED,

      ADDITIONAL: UTI SITE NOT SPECIFIED.



PRESCRIPTION

Tylenol-Codeine #3:  TABLET : 300 mg-30 mg : ORAL : Quantity: ***

      1 *** Unit: tab(s) Route: ORAL Schedule: every 4 hours prn Dispense:

      *** 20 ***

      May substitute. Refills: *** No Refills ***

      POTENTIAL ALLERGY REACTION: '[place]

      [acetaminophen/hydrocodone/hydrocodone bitartrate]'

      Override Rationale: Reviewed with patient, pt says can safely take

      this medicine. (21:05 LLDO)

NOTES:  No Refills. (21:05 LLDO)

Zithromax Z-Rony:  CAPSULE (HARD, SOFT, ETC.) : 250 mg : ORAL :

      Quantity: *** * *** Unit: Route: ORAL Schedule: See Notes Dispense:

      *** 1PK ***

      May substitute. Refills: *** No Refills ***. (21:05 LLDO)

NOTES:  TAKE AS DIRECTED ON PACKAGE

       No Refills. (21:05 LLDO)

Septra DS:  TABLET : 800 mg-160 mg : ORAL : Quantity: *** 1 ***

      Unit: tab(s) Route: ORAL Schedule: 2 times a day (before meals)

      Dispense: *** 20 *** Unit: tab(s)

      May substitute. Refills: *** No Refills ***. (21:07 LLDO)

Zofran ODT:  TABLET, RAPID DISSOLVE : 4 mg : ORAL : Quantity: ***

      1-2 *** Unit: tab(s) Route: ORAL Schedule: every 6 hours PRN



&a-1R&a+25V*p+0X*w6244L*c202B*c15G*c2P*p-0X&a-25V&a+1R           Name: Yovany Martini  : 1945 F71 MedRec: [id number]

                             AcctNum: [id number]

  Prepared:  23:45 by Interface                 Page 4 of 5

                                      pMD

                        [facility]

                                EMERGENCY RECORD



      Dispense: *** 12 ***

      May substitute. Refills: *** 1 ***. (21:07 LLDO)

NOTES:  DISSOLVE UNDER TONGUE. (21:07 LLDO)



DISPOSITION

PATIENT:  Disposition Type: Discharge, Disposition: *Discharge

      Home. (21:01 LLDO)

   Disposition Transport: Car, Condition: Good. (21:46 MCRS)

   Patient left the department. (21:46 MCRS)

[place]:

  LLDO=MD Frannie, Jhony  MCRS=DWAYNE Berumen, Antonio

















































































&a-1R&a+25V*p+0X*h2719G*c202B*c15G*c2P*p-0X&a-25V&a+1R           Name: Yovany Martini  : 1945 F71 MedRec: [id number]

                             AcctNum: [id number]

  Prepared: Thu 2017 23:45 by Interface                 Page 5 of 5

                                      pMD

MTDD

## 2023-02-22 ENCOUNTER — HOSPITAL ENCOUNTER (OUTPATIENT)
Dept: HOSPITAL 92 - BICMAMMO | Age: 78
Discharge: HOME | End: 2023-02-22
Attending: NURSE PRACTITIONER
Payer: MEDICARE

## 2023-02-22 DIAGNOSIS — M85.852: ICD-10-CM

## 2023-02-22 DIAGNOSIS — Z13.820: Primary | ICD-10-CM

## 2023-02-22 PROCEDURE — 77080 DXA BONE DENSITY AXIAL: CPT
